# Patient Record
(demographics unavailable — no encounter records)

---

## 2025-02-18 NOTE — HISTORY OF PRESENT ILLNESS
[de-identified] : 52 years old female has past medical history of abnormal LFT, paroxysmal atrial tachycardia, diabetes, anxiety and depression, hyperlipidemia, migraine headache, orthostatic hypotension, syncope, retinopathy and vitamin D deficiency, came back for follow up.

## 2025-06-15 NOTE — PHYSICAL EXAM
[No Acute Distress] : no acute distress [Normal Sclera/Conjunctiva] : normal sclera/conjunctiva [PERRL] : pupils equal round and reactive to light [EOMI] : extraocular movements intact [Normal Outer Ear/Nose] : the outer ears and nose were normal in appearance [Normal Oropharynx] : the oropharynx was normal [No JVD] : no jugular venous distention [No Lymphadenopathy] : no lymphadenopathy [Supple] : supple [Thyroid Normal, No Nodules] : the thyroid was normal and there were no nodules present [No Respiratory Distress] : no respiratory distress  [No Accessory Muscle Use] : no accessory muscle use [Clear to Auscultation] : lungs were clear to auscultation bilaterally [Normal Rate] : normal rate  [Regular Rhythm] : with a regular rhythm [Normal S1, S2] : normal S1 and S2 [No Murmur] : no murmur heard [No Varicosities] : no varicosities [Pedal Pulses Present] : the pedal pulses are present [No Edema] : there was no peripheral edema [No Palpable Aorta] : no palpable aorta [No Extremity Clubbing/Cyanosis] : no extremity clubbing/cyanosis [Soft] : abdomen soft [Non Tender] : non-tender [Non-distended] : non-distended [No Masses] : no abdominal mass palpated [No HSM] : no HSM [Normal Bowel Sounds] : normal bowel sounds [Normal Anterior Cervical Nodes] : no anterior cervical lymphadenopathy [No CVA Tenderness] : no CVA  tenderness [No Spinal Tenderness] : no spinal tenderness [Grossly Normal Strength/Tone] : grossly normal strength/tone [No Joint Swelling] : no joint swelling [No Rash] : no rash [Coordination Grossly Intact] : coordination grossly intact [No Focal Deficits] : no focal deficits [Normal Gait] : normal gait [Deep Tendon Reflexes (DTR)] : deep tendon reflexes were 2+ and symmetric [Normal Affect] : the affect was normal [Normal Insight/Judgement] : insight and judgment were intact

## 2025-06-15 NOTE — PHYSICAL EXAM
[No Acute Distress] : no acute distress [Normal Sclera/Conjunctiva] : normal sclera/conjunctiva [PERRL] : pupils equal round and reactive to light [EOMI] : extraocular movements intact [Normal Outer Ear/Nose] : the outer ears and nose were normal in appearance [Normal Oropharynx] : the oropharynx was normal [No JVD] : no jugular venous distention [No Lymphadenopathy] : no lymphadenopathy [Supple] : supple [Thyroid Normal, No Nodules] : the thyroid was normal and there were no nodules present [No Respiratory Distress] : no respiratory distress  [No Accessory Muscle Use] : no accessory muscle use [Clear to Auscultation] : lungs were clear to auscultation bilaterally [Normal Rate] : normal rate  [Regular Rhythm] : with a regular rhythm [Normal S1, S2] : normal S1 and S2 [No Murmur] : no murmur heard [No Varicosities] : no varicosities [Pedal Pulses Present] : the pedal pulses are present [No Edema] : there was no peripheral edema [No Palpable Aorta] : no palpable aorta [No Extremity Clubbing/Cyanosis] : no extremity clubbing/cyanosis [Non Tender] : non-tender [Soft] : abdomen soft [Non-distended] : non-distended [No Masses] : no abdominal mass palpated [No HSM] : no HSM [Normal Bowel Sounds] : normal bowel sounds [Normal Anterior Cervical Nodes] : no anterior cervical lymphadenopathy [No CVA Tenderness] : no CVA  tenderness [No Spinal Tenderness] : no spinal tenderness [No Joint Swelling] : no joint swelling [Grossly Normal Strength/Tone] : grossly normal strength/tone [No Rash] : no rash [Coordination Grossly Intact] : coordination grossly intact [No Focal Deficits] : no focal deficits [Normal Gait] : normal gait [Deep Tendon Reflexes (DTR)] : deep tendon reflexes were 2+ and symmetric [Normal Affect] : the affect was normal [Normal Insight/Judgement] : insight and judgment were intact

## 2025-06-15 NOTE — HISTORY OF PRESENT ILLNESS
[de-identified] : 52 years old female with past medical history of abnormal LFT, paroxysmal atrial tachycardia, diabetes, anxiety and depression, hyperlipidemia, migraine headache, orthostatic hypotension, syncope, retinopathy and vitamin D deficiency came back for annual physical exam.

## 2025-06-15 NOTE — HISTORY OF PRESENT ILLNESS
[de-identified] : 52 years old female with past medical history of abnormal LFT, paroxysmal atrial tachycardia, diabetes, anxiety and depression, hyperlipidemia, migraine headache, orthostatic hypotension, syncope, retinopathy and vitamin D deficiency came back for annual physical exam.

## 2025-06-18 NOTE — HISTORY OF PRESENT ILLNESS
[FreeTextEntry1] : Angela Kaminski is a 52 y/o woman with Hx of DMII, HLD, syncope s/p ILR placement, postural orthostatic tachycardia syndrome and orthostatic hypotension maintained on midodrine, who presents today for routine follow up. She had 2 episodes of syncope since last appt. On 4/30/25, she was in [facility] visiting daughter and was walking and when she got to the kitchen, lost balance and passed out for 2-3 minutes. The second time on 5/4, with this episode she had more symptoms of nausea and dizziness. It happened at 3 am and her daughter found her on the floor. She injured her tail bone with the fall.  She went to went to the ED and that's how she found out she had Covid and UTI.  Currently, she still feels tired and feels thumping and skipped beats and racing in the heart and dizziness stays with her. Feels like her head is in a fog after these episodes. She had lost 30 lbs with diet and does not exercise.  Of note, she adopted granddaughter. Age 2 1/2. Not currently working but as a . Has 3 children 11, 18, 28. Has been taking midodrine, 5mg every 3 hours (total of 8 doses a day), metoprolol 50 mg daily and  and Corlanor 5 mg daily. Denies chest pain, SOB, syncope

## 2025-06-18 NOTE — DISCUSSION/SUMMARY
[FreeTextEntry1] : Impression:  1. POTS: EKG today with NSR. Will continue Corlanor 5mg BID for symptom management and resume metoprolol and midodrine as prescribed. ILR is monitoring 8/2023 was at [facility] and remote discontinued. She is asking about re-implant of ILR to assess for arrhythmias.  Orthostatic hypotension:  Instructed on safety mechanisms such as staying well hydrated, utilizing salt, avoiding prolonged standing with knees locked, and to lie down with feet elevated if symptoms of near syncope occur. Can also consider use of compression socks and should avoid known triggers for syncope such as alcohol and warm or crowded environments. Supine exercises and she does walk. Most likely etiology of exacerbation is COVID which was diagnosed in [facility]. Patient also had UTI. Will increase midodrine to 7.5 mg PO every 3 hours while awake until her symptoms improve which I suspect will happen as time passes since last COVID infection: she probably has long COVID.     2. Syncope x 2 : likely patient has an overlap of POTS and vasovagal syndrome Also, previously referred patient to Dr. Arturo Robison for further dysautonomia evaluation. Episodes occurred 4/30 and 5/4 and was diagnosed with Covid and UTI so may have had some dehydration at that time.  3. Orthostatic hypotension: Given recurrent dizziness and syncope in setting of hypotension, recommend continuing Midodrine and takes 8 times a day. Instructed on safety mechanisms such as staying well hydrated, utilizing salt, avoiding prolonged standing with knees locked, and to lie down with feet elevated if symptoms of near syncope occur. Can also consider use of compression socks and should avoid known triggers for syncope such as alcohol and warm or crowded environments. Supine exercises and she does walk. Patient's blood pressure in clinic today was high but did not take her metoprolol yet.  4. HLD: resume statin therapy as prescribed and regular f/u with Cardiologist for routine lipid monitoring and management.   Continue f/u with Cardiologist and RTO for f/u in 6 months.

## 2025-06-18 NOTE — DISCUSSION/SUMMARY
[FreeTextEntry1] : Impression:  1. POTS: EKG today with NSR. Will continue Corlanor 5mg BID for symptom management and resume metoprolol and midodrine as prescribed. ILR is monitoring 8/2023 was at [facility] and remote discontinued. She is asking about re-implant of ILR to assess for arrhythmias.  Orthostatic hypotension:  Instructed on safety mechanisms such as staying well hydrated, utilizing salt, avoiding prolonged standing with knees locked, and to lie down with feet elevated if symptoms of near syncope occur. Can also consider use of compression socks and should avoid known triggers for syncope such as alcohol and warm or crowded environments. Supine exercises and she does walk. Most likely etiology of exacerbation is COVID which was diagnosed in [facility]. Patient also had UTI. Will increase midodrine to 7.5 mg PO every 3 hours while awake until her symptoms improve which I suspect will happen as time passes since last COVID infection: she probably has long COVID.     2. Syncope x 2 : likely patient has an overlap of POTS and vasovagal syndrome Also, previously referred patient to Dr. Arturo Robison for further dysautonomia evaluation. Episodes occurred 4/30 and 5/4 and was diagnosed with Covid and UTI so may have had some dehydration at that time.  3. Orthostatic hypotension: Given recurrent dizziness and syncope in setting of hypotension, recommend continuing Midodrine and takes 8 times a day. Instructed on safety mechanisms such as staying well hydrated, utilizing salt, avoiding prolonged standing with knees locked, and to lie down with feet elevated if symptoms of near syncope occur. Can also consider use of compression socks and should avoid known triggers for syncope such as alcohol and warm or crowded environments. Supine exercises and she does walk. Patient's blood pressure in clinic today was high but did not take her metoprolol yet.  4. HLD: resume statin therapy as prescribed and regular f/u with Cardiologist for routine lipid monitoring and management.   Continue f/u with Cardiologist and RTO for f/u in 6 months.

## 2025-06-18 NOTE — CARDIOLOGY SUMMARY
[de-identified] : 6/17/25 NSR 67, NSST 5/28/24 NSR 80, NSST [de-identified] : 8/16/22 NST:  GATED ANALYSIS: Post-stress gated wall motion analysis was performed (LVEF > 70%;LVEDV = 48 ml.), revealing normal LV function. The RV function appeared normal. ------------------------------------------------------------------------ IMPRESSIONS:Normal Study * Myocardial Perfusion SPECT results are normal. * Review of raw data shows: The study is of good technical quality. * The left ventricle was small in size. Normal myocardial perfusion scan,with no evidence of infarction or inducible ischemia. * Post-stress gated wall motion analysis was performed (LVEF > 70%;LVEDV = 48 ml.), revealing normal LV function. The RV function appeared normal.

## 2025-06-18 NOTE — CARDIOLOGY SUMMARY
[de-identified] : 6/17/25 NSR 67, NSST 5/28/24 NSR 80, NSST [de-identified] : 8/16/22 NST:  GATED ANALYSIS: Post-stress gated wall motion analysis was performed (LVEF > 70%;LVEDV = 48 ml.), revealing normal LV function. The RV function appeared normal. ------------------------------------------------------------------------ IMPRESSIONS:Normal Study * Myocardial Perfusion SPECT results are normal. * Review of raw data shows: The study is of good technical quality. * The left ventricle was small in size. Normal myocardial perfusion scan,with no evidence of infarction or inducible ischemia. * Post-stress gated wall motion analysis was performed (LVEF > 70%;LVEDV = 48 ml.), revealing normal LV function. The RV function appeared normal.

## 2025-06-18 NOTE — HISTORY OF PRESENT ILLNESS
[FreeTextEntry1] : Angela Kaminski is a 54 y/o woman with Hx of DMII, HLD, syncope s/p ILR placement, postural orthostatic tachycardia syndrome and orthostatic hypotension maintained on midodrine, who presents today for routine follow up. She had 2 episodes of syncope since last appt. On 4/30/25, she was in [facility] visiting daughter and was walking and when she got to the kitchen, lost balance and passed out for 2-3 minutes. The second time on 5/4, with this episode she had more symptoms of nausea and dizziness. It happened at 3 am and her daughter found her on the floor. She injured her tail bone with the fall.  She went to went to the ED and that's how she found out she had Covid and UTI.  Currently, she still feels tired and feels thumping and skipped beats and racing in the heart and dizziness stays with her. Feels like her head is in a fog after these episodes. She had lost 30 lbs with diet and does not exercise.  Of note, she adopted granddaughter. Age 2 1/2. Not currently working but as a . Has 3 children 11, 18, 28. Has been taking midodrine, 5mg every 3 hours (total of 8 doses a day), metoprolol 50 mg daily and  and Corlanor 5 mg daily. Denies chest pain, SOB, syncope